# Patient Record
Sex: FEMALE | Race: WHITE | URBAN - METROPOLITAN AREA
[De-identification: names, ages, dates, MRNs, and addresses within clinical notes are randomized per-mention and may not be internally consistent; named-entity substitution may affect disease eponyms.]

---

## 2018-05-31 ENCOUNTER — APPOINTMENT (RX ONLY)
Dept: URBAN - METROPOLITAN AREA CLINIC 157 | Facility: CLINIC | Age: 33
Setting detail: DERMATOLOGY
End: 2018-05-31

## 2018-05-31 DIAGNOSIS — D18.0 HEMANGIOMA: ICD-10-CM

## 2018-05-31 PROBLEM — D18.01 HEMANGIOMA OF SKIN AND SUBCUTANEOUS TISSUE: Status: ACTIVE | Noted: 2018-05-31

## 2018-05-31 PROCEDURE — 99201: CPT

## 2018-05-31 PROCEDURE — ? COUNSELING

## 2018-05-31 ASSESSMENT — LOCATION SIMPLE DESCRIPTION DERM: LOCATION SIMPLE: NOSE

## 2018-05-31 ASSESSMENT — LOCATION ZONE DERM: LOCATION ZONE: NOSE

## 2018-05-31 ASSESSMENT — LOCATION DETAILED DESCRIPTION DERM: LOCATION DETAILED: NASAL SUPRATIP

## 2018-05-31 NOTE — HPI: SKIN LESIONS
Additional History: Pt here for nose evail. Pt states has had angiomas on her nose before and prev derm removed by cauterization with good result.  \\n\\nNo other complaints